# Patient Record
Sex: FEMALE | Race: WHITE
[De-identification: names, ages, dates, MRNs, and addresses within clinical notes are randomized per-mention and may not be internally consistent; named-entity substitution may affect disease eponyms.]

---

## 2017-05-31 ENCOUNTER — HOSPITAL ENCOUNTER (OUTPATIENT)
Dept: HOSPITAL 45 - X.SURG | Age: 67
Discharge: HOME | End: 2017-05-31
Attending: PHYSICAL MEDICINE & REHABILITATION
Payer: COMMERCIAL

## 2017-05-31 ENCOUNTER — HOSPITAL ENCOUNTER (OUTPATIENT)
Dept: HOSPITAL 45 - C.LAB1850 | Age: 67
Discharge: HOME | End: 2017-05-31
Attending: PHYSICIAN ASSISTANT
Payer: COMMERCIAL

## 2017-05-31 VITALS
HEIGHT: 65 IN | BODY MASS INDEX: 32.72 KG/M2 | BODY MASS INDEX: 32.72 KG/M2 | WEIGHT: 196.41 LBS | WEIGHT: 196.41 LBS | HEIGHT: 65 IN

## 2017-05-31 VITALS — HEART RATE: 70 BPM | DIASTOLIC BLOOD PRESSURE: 77 MMHG | SYSTOLIC BLOOD PRESSURE: 143 MMHG | OXYGEN SATURATION: 96 %

## 2017-05-31 VITALS — TEMPERATURE: 98.06 F

## 2017-05-31 DIAGNOSIS — E05.90: ICD-10-CM

## 2017-05-31 DIAGNOSIS — E05.90: Primary | ICD-10-CM

## 2017-05-31 DIAGNOSIS — M48.06: Primary | ICD-10-CM

## 2017-05-31 DIAGNOSIS — M54.16: ICD-10-CM

## 2017-05-31 LAB — TSH SERPL-ACNC: 0.76 UIU/ML (ref 0.3–4.5)

## 2017-05-31 NOTE — OPERATIVE REPORT
DATE OF OPERATION:  05/31/2017

 

PREOPERATIVE DIAGNOSIS:  Lumbar spinal stenosis with history of a bilateral

left greater than right radiculopathy and prior lumbar surgery.

 

POSTOPERATIVE DIAGNOSIS:  Same.

 

INDICATIONS:  The patient is a 66-year-old white female who has had prior

back surgery with persistent radicular pain.  She has responded favorably to

caudal epidural injection done 7 months ago with good relief of radicular

pain; however, has returned and is problematic to her.  She presents today

for a caudal epidural steroid injection to provide her with relief of

radicular complaints.

 

PHYSICAL EXAMINATION:  Pleasant female seated comfortably.  Point tenderness

to palpation on the left side, positive straight leg raise on the left lower

extremity, decreased subjective sensation in the L4 dermatomal distribution.

 

CONSENT:  Verbal and written consent was obtained from the patient.  Risks

and benefits were reviewed.  Risks include but are not limited to epidural

abscess, epidural hematoma, allergic reaction.  The patient wishes to

proceed.

 

PROCEDURE:  The patient was taken back to the special procedures room of the

[facility] where she was maintained in a prone position. 

Backside was cleansed with Betadine x3 and a dry sterile dressing was

applied.  Fluoroscope was used to identify the sacral hiatus and the

overlying skin was anesthetized with 5 mL of lidocaine 1% with a 25 gauge

1.5-inch needle.  A 25 gauge 3.5 inch spinal needle was then directed under

lateral fluoroscopic guidance into the canal.  She then underwent injection

after negative aspiration of 4 mL of preservative free sodium chloride and 40

mg of Depo-Medrol.  Injection was well tolerated.

 

DISPOSITION:

1. The patient is taken out into the discharge recovery area where she will

be discharged home once discharge criteria have been met.

2. Follow up in the [facility] office in 2-4 weeks.

 

 

I attest to the content of the Intraoperative Record and any orders documented therein. Any exceptio
ns are noted below.

## 2017-05-31 NOTE — DISCHARGE INSTRUCTIONS
Discharge Instructions


Date of Service


May 31, 2017.





Visit


Reason for Visit:  Lumbar Radiculopathy





Discharge


Discharge Diagnosis / Problem:  leg pain





Discharge Goals


Goal(s):  Decrease discomfort, Improve function





Activity Recommendations


Activity Limitations:  resume your previous activity





Anesthesia


.





Post Anesthesia Instructions:





If you have had General Anesthesia or IV Sedation:





*  Do not drive today.


*  Resume driving when surgeon permits.


*  Do not make important decisions or sign legal documents today.


*  Call surgeon for:





   1.  Temperature elevations greater than 101 degrees F.


   2.  Uncontrollable pain.


   3.  Excessive bleeding.


   4.  Persistent nausea and vomiting.


   5.  Medication intolerance (nausea, vomiting or rash).





*  For nausea and vomiting use only clear liquids such as: tea, soda, bouillon 

until nausea subsides, then gradually increase diet as tolerated.





*  If you have any concerns or questions, call your surgeon's office.  If 

physician is unavailable and it is an emergency, call 911 or go to the nearest 

emergency room.





.





Diet Recommendations


Recommended Home Diet:  resume previous diet





Procedures


Procedures Performed:  


Caudal Epidural Steroid Injection





Pending Studies


Studies pending at discharge:  no





Medical Emergencies








.


Who to Call and When:





Medical Emergencies:  If at any time you feel your situation is an emergency, 

please call 911 immediately.





.





Non-Emergent Contact


Non-Emergency issues call your:  Specialist





.


.





"Provider Documentation" section prepared by Arian Schmitt.








.

## 2017-07-12 ENCOUNTER — HOSPITAL ENCOUNTER (OUTPATIENT)
Dept: HOSPITAL 45 - X.SURG | Age: 67
Discharge: HOME | End: 2017-07-12
Attending: PHYSICAL MEDICINE & REHABILITATION
Payer: COMMERCIAL

## 2017-07-12 VITALS
BODY MASS INDEX: 32.72 KG/M2 | HEIGHT: 65 IN | HEIGHT: 65 IN | WEIGHT: 196.41 LBS | WEIGHT: 196.41 LBS | BODY MASS INDEX: 32.72 KG/M2

## 2017-07-12 VITALS — HEART RATE: 71 BPM | SYSTOLIC BLOOD PRESSURE: 131 MMHG | OXYGEN SATURATION: 96 % | DIASTOLIC BLOOD PRESSURE: 72 MMHG

## 2017-07-12 VITALS — TEMPERATURE: 98.96 F

## 2017-07-12 DIAGNOSIS — M46.1: Primary | ICD-10-CM

## 2017-07-12 DIAGNOSIS — Z98.1: ICD-10-CM

## 2017-07-12 DIAGNOSIS — M54.16: ICD-10-CM

## 2017-07-12 NOTE — [facility] OPERATIVE REPORT
Operative Report


Date of Service


Jul 12, 2017.





Operative Report


DICTATED BY:  Arian Schmitt M.D.


DATE OF SURGERY:  07/12/17.


 


DATE OF OPERATION:  07/12/2017.


 


PREOPERATIVE DIAGNOSIS:  Left sacroiliitis, history of fusion.


 


POSTOPERATIVE DIAGNOSIS:  Same.


 


PROCEDURE:  Left SI joint injection under fluoroscopic guidance.


 


SURGEON: Dr. Arian Schmitt.  


 


INDICATIONS:  The patient is a 66-year-old white female who last underwent


bilateral SI joint injections in December in which she had 50% improvement.  She


has had some increasing pain over the last month and presents today for


left SI joint injection to provide her with relief.


 


PHYSICAL EXAMINATION:  Pleasant female seated comfortably.  She has point


tenderness to palpation over her  SI joints bilaterally.  Sciatic notches are


nontender, slight increase with extension.  No problems with flexion.  She


has normal lower extremity strength.  Positive modified Dre maneuver. 


Negative seated straight leg raises.


 


CONSENT:  Verbal and written consent was obtained from the patient.  Risks


and benefits were reviewed.  Risks include but are not limited to abscess and


allergic reaction.  She wishes to proceed.


 


PROCEDURE:  The patient was taken back to the special procedures room of


[facility].  She was maintained in a prone position. 


Backside was cleansed with Betadine x3 and a dry sterile dressing was


applied.  Fluoroscope was used to identify the left SI joint.  The overlying


skin was anesthetized with 2 mL of lidocaine 1% with a 25 gauge 1.5-inch


needle.  A 25 gauge 3.5 inch spinal needle was then directed into the joint. 


There was a give as it entered the joint.  Isovue-300 contrast 0.25 mL was


injected in which demonstrated intraarticular uptake pattern.  She then


underwent injection after negative aspiration of 40 mg of Depo-Medrol and 1.5


mL of bupivacaine 0.25%.  


 


DISPOSITION:


1. The patient is taken out into the discharge recovery area where she will


be discharged home once discharge criteria have been met.


2. Follow up in the [facility] office in 2-4 weeks.


 


 


I attest to the content of the Intraoperative Record and any orders documented 

therein. Any exceptions are noted below.


I attest to the content of the Intraoperative Record and any orders documented 

therein.  Any exceptions are noted below.

## 2017-07-12 NOTE — DISCHARGE INSTRUCTIONS
Discharge Instructions


Date of Service


Jul 12, 2017.





Visit


Reason for Visit:  Sacroiliitis





Discharge


Discharge Diagnosis / Problem:  left sacroilitis





Discharge Goals


Goal(s):  Decrease discomfort, Improve function





Activity Recommendations


Activity Limitations:  resume your previous activity





Anesthesia


.





Post Anesthesia Instructions:





If you have had General Anesthesia or IV Sedation:





*  Do not drive today.


*  Resume driving when surgeon permits.


*  Do not make important decisions or sign legal documents today.


*  Call surgeon for:





   1.  Temperature elevations greater than 101 degrees F.


   2.  Uncontrollable pain.


   3.  Excessive bleeding.


   4.  Persistent nausea and vomiting.


   5.  Medication intolerance (nausea, vomiting or rash).





*  For nausea and vomiting use only clear liquids such as: tea, soda, bouillon 

until nausea subsides, then gradually increase diet as tolerated.





*  If you have any concerns or questions, call your surgeon's office.  If 

physician is unavailable and it is an emergency, call 911 or go to the nearest 

emergency room.





.





Diet Recommendations


Recommended Home Diet:  resume previous diet





Procedures


Procedures Performed:  


Left Sacroiliac Steroid Injection





Pending Studies


Studies pending at discharge:  no





Medical Emergencies








.


Who to Call and When:





Medical Emergencies:  If at any time you feel your situation is an emergency, 

please call 911 immediately.





.





Non-Emergent Contact


Non-Emergency issues call your:  Specialist





.


.








"Provider Documentation" section prepared by Arian Schmitt.








.

## 2017-08-10 ENCOUNTER — HOSPITAL ENCOUNTER (OUTPATIENT)
Dept: HOSPITAL 45 - C.LAB1850 | Age: 67
Discharge: HOME | End: 2017-08-10
Attending: PHYSICIAN ASSISTANT
Payer: COMMERCIAL

## 2017-08-10 DIAGNOSIS — E78.5: Primary | ICD-10-CM

## 2017-08-10 DIAGNOSIS — E05.90: ICD-10-CM

## 2017-08-10 DIAGNOSIS — E55.9: ICD-10-CM

## 2017-08-10 LAB
ALBUMIN/GLOB SERPL: 1.1 {RATIO} (ref 0.9–2)
ALP SERPL-CCNC: 68 U/L (ref 45–117)
ALT SERPL-CCNC: 25 U/L (ref 12–78)
ANION GAP SERPL CALC-SCNC: 6 MMOL/L (ref 3–11)
AST SERPL-CCNC: 16 U/L (ref 15–37)
BUN SERPL-MCNC: 12 MG/DL (ref 7–18)
BUN/CREAT SERPL: 16.7 (ref 10–20)
CALCIUM SERPL-MCNC: 9.7 MG/DL (ref 8.5–10.1)
CHLORIDE SERPL-SCNC: 105 MMOL/L (ref 98–107)
CHOLEST/HDLC SERPL: 2.9 {RATIO}
CO2 SERPL-SCNC: 29 MMOL/L (ref 21–32)
CREAT SERPL-MCNC: 0.72 MG/DL (ref 0.6–1.2)
GLOBULIN SER-MCNC: 3.6 GM/DL (ref 2.5–4)
GLUCOSE SERPL-MCNC: 81 MG/DL (ref 70–99)
GLUCOSE UR QL: 80 MG/DL
KETONES UR QL STRIP: 106 MG/DL
NITRITE UR QL STRIP: 217 MG/DL (ref 0–150)
PH UR: 229 MG/DL (ref 0–200)
POTASSIUM SERPL-SCNC: 3.8 MMOL/L (ref 3.5–5.1)
SODIUM SERPL-SCNC: 140 MMOL/L (ref 136–145)
TSH SERPL-ACNC: 1.62 UIU/ML (ref 0.3–4.5)
VERY LOW DENSITY LIPOPROT CALC: 43 MG/DL

## 2017-11-22 ENCOUNTER — HOSPITAL ENCOUNTER (OUTPATIENT)
Dept: HOSPITAL 45 - C.MAMM | Age: 67
Discharge: HOME | End: 2017-11-22
Attending: NURSE PRACTITIONER
Payer: COMMERCIAL

## 2017-11-22 DIAGNOSIS — Z12.31: Primary | ICD-10-CM

## 2017-11-24 NOTE — MAMMOGRAPHY REPORT
BILATERAL DIGITAL SCREENING MAMMOGRAM TOMOSYNTHESIS WITH CAD: 11/22/2017

CLINICAL HISTORY: Patient scheduled for annual screening mammography. During her appointment she repo
rted a new lump in the left upper outer quadrant.  





TECHNIQUE: Bilateral breast tomosynthesis in addition to standard 2D mammography was performed. Curre
nt study was also evaluated with a Computer Aided Detection (CAD) system.  



COMPARISON: Comparison is made to exams dated:  11/16/2016 mammogram, 11/13/2015 mammogram, 11/12/201
4 mammogram, 11/6/2013 mammogram, 11/5/2012 mammogram, and 11/4/2011 mammogram - [facility].   



BREAST COMPOSITION:  There are scattered areas of fibroglandular density in both breasts.  



FINDINGS:  A triangular skin palpable marker was placed on the skin of the the left upper outer quadr
ant, denoting the area of palpable mass pointed out by the patient.



There is no evidence of a new suspicious mass, asymmetry, area of distortion or cluster of microcalci
fications bilaterally, with particular attention to the area of palpable concern in the left upper ou
ter quadrant.  However, standard of care for any new palpable finding is mammography plus ultrasound 
and therefore additional targeted ultrasound is recommended in the left breast.



There are scattered stable benign-appearing coarse calcifications bilaterally.



IMPRESSION:  ACR BI-RADS CATEGORY 0: INCOMPLETE EVALUATION:  NEED ADDITIONAL IMAGING EVALUATION

Stable bilateral mammograms, without definite mammographic evidence of malignancy.  However, the cameron
ent reports a new palpable lump in the left upper outer quadrant, for which additional targeted ultra
sound is recommended.



The patient will be called to schedule an appointment.  





Approximately 10% of breast cancers are not detected with mammography. A negative mammographic report
 should not delay biopsy if a clinically suggestive mass is present.



Annette Ford M.D.          

ay/:11/22/2017 16:06:28  



Imaging Technologist: Sandra Hensley, [facility]

letter sent: Addl Imaging 0  

BI-RADS Code: ACR BI-RADS Category 0: Incomplete Evaluation:  Need Additional Imaging Evaluation

## 2017-12-08 ENCOUNTER — HOSPITAL ENCOUNTER (OUTPATIENT)
Dept: HOSPITAL 45 - C.MAMM | Age: 67
Discharge: HOME | End: 2017-12-08
Attending: INTERNAL MEDICINE
Payer: COMMERCIAL

## 2017-12-08 DIAGNOSIS — N63.20: Primary | ICD-10-CM

## 2017-12-11 NOTE — MAMMOGRAPHY REPORT
ULTRASOUND OF LEFT BREAST: 12/8/2017

CLINICAL HISTORY: The patient reported a lumpy region in her left breast at the time of her screening
 mammogram.  Further evaluation with ultrasound was recommended.  





COMPARISON: Comparison is made to exams dated:  11/22/2017 mammogram, 11/16/2016 mammogram, 11/13/201
5 mammogram, 11/12/2014 mammogram, 11/6/2013 mammogram, and 11/5/2012 mammogram - [facility].   



TECHNIQUE:  Real-time targeted ultrasound was performed of the left breast.  



FINDINGS:  Real-time, high resolution targeted ultrasound was performed of the lumpy region in the le
ft breast pointed out by the patient.  She could not pinpoint the exact location of the lumps but poi
nted to the general region in the left upper outer quadrant.  Sonographically normal tissue is seen i
n this region, without evidence of a mass or other suspicious sonographic abnormality.





IMPRESSION: ACR BI-RADS CATEGORY 1: NEGATIVE 

No suspicious sonographic abnormality at the site of the lumpiness in the left breast pointed out by 
the patient.  There is no sonographic evidence of malignancy.  Recommend clinical follow-up for the p
alpable findings, and recommend routine bilateral screening mammograms in one year.



The patient was verbally notified of the results.



Mmae Fuentes M.D.  

/:12/8/2017 14:15:16  



Imaging Technologist: Sandra PETERS(R)(M), [facility]

letter sent: Normal 1/2  

BI-RADS Code: ACR BI-RADS Category 1: Negative

## 2018-01-04 ENCOUNTER — HOSPITAL ENCOUNTER (OUTPATIENT)
Dept: HOSPITAL 45 - X.SURG | Age: 68
Discharge: HOME | End: 2018-01-04
Attending: PHYSICAL MEDICINE & REHABILITATION
Payer: COMMERCIAL

## 2018-01-04 VITALS
TEMPERATURE: 98.6 F | OXYGEN SATURATION: 95 % | DIASTOLIC BLOOD PRESSURE: 80 MMHG | HEART RATE: 77 BPM | SYSTOLIC BLOOD PRESSURE: 125 MMHG

## 2018-01-04 VITALS
WEIGHT: 200.42 LBS | BODY MASS INDEX: 33.39 KG/M2 | WEIGHT: 200.42 LBS | HEIGHT: 65 IN | BODY MASS INDEX: 33.39 KG/M2 | HEIGHT: 65 IN

## 2018-01-04 DIAGNOSIS — M46.1: Primary | ICD-10-CM

## 2018-01-04 NOTE — OPERATIVE REPORT
DATE OF OPERATION:  01/04/2018

 

PREOPERATIVE DIAGNOSIS:  Left sacroiliitis.

 

POSTOPERATIVE DIAGNOSIS:  Same.

 

PROCEDURE:  Left sacroiliac joint injection under fluoroscopic guidance.

 

SURGEON:  Dr. Arian Schmitt.  

 

INDICATIONS:  The patient is a 67-year-old white female who presents today

for a left SI joint injection for chronic sacroiliac pain.  The last

injection she received was in the third week of July.  Pain has been

returning since that time as she responds very favorably to the injections. 

She presents today for an injection to provide her with relief.

 

PHYSICAL EXAMINATION:  Pleasant female seated comfortably.  She has some

point tenderness to palpation of the left SI joint, worse with extension. 

She has normal lower extremity strength.  Negative seated straight leg

raises.

 

CONSENT:  Verbal and written consent was obtained from the patient.  Risks

and benefits were reviewed.  Risks include but are not limited to abscess and

allergic reaction.  The patient wishes to proceed.

 

PROCEDURE:  The patient was taken back in the special procedures room of the

[facility] where she was maintained in a prone position. 

Backside was cleansed with Betadine x3 and a dry sterile dressing was

applied.  Fluoroscope was used to identify the left SI joint.  The overlying

skin was anesthetized with 2.5 mL of lidocaine 1% with a 25 gauge 1.5-inch

needle.  A 25 gauge 3.5 inch needle was then used to enter the joint. 

Isovue-300 contrast 0.25 mL was injected in which showed intra-articular

placement.  She then underwent injection after negative aspiration of 40 mg

of Depo-Medrol and 1.5 mL of bupivacaine 0.25%.  Injection was well

tolerated.

 

DISPOSITION:

1. The patient is taken out into the discharge recovery area where she will

be discharged home once discharge criteria have been met.

2. Follow up in the [facility] Medicine office in 4 weeks' time.

 

 

I attest to the content of the Intraoperative Record and any orders documented therein. Any exception
s are noted below.

## 2018-01-04 NOTE — DISCHARGE INSTRUCTIONS
Discharge Instructions


Date of Service


Jan 4, 2018.





Visit


Reason for Visit:  Sacroiliitis





Discharge


Discharge Diagnosis / Problem:  low back pain





Discharge Goals


Goal(s):  Decrease discomfort, Improve function





Activity Recommendations


Activity Limitations:  resume your previous activity





Anesthesia


.





Post Anesthesia Instructions:





If you have had General Anesthesia or IV Sedation:





*  Do not drive today.


*  Resume driving when surgeon permits.


*  Do not make important decisions or sign legal documents today.


*  Call surgeon for:





   1.  Temperature elevations greater than 101 degrees F.


   2.  Uncontrollable pain.


   3.  Excessive bleeding.


   4.  Persistent nausea and vomiting.


   5.  Medication intolerance (nausea, vomiting or rash).





*  For nausea and vomiting use only clear liquids such as: tea, soda, bouillon 

until nausea subsides, then gradually increase diet as tolerated.





*  If you have any concerns or questions, call your surgeon's office.  If 

physician is unavailable and it is an emergency, call 911 or go to the nearest 

emergency room.





.





Diet Recommendations


Recommended Home Diet:  resume previous diet





Procedures


Procedures Performed:  


Left Sacroiliac Joint Injection





Pending Studies


Studies pending at discharge:  no





Medical Emergencies








.


Who to Call and When:





Medical Emergencies:  If at any time you feel your situation is an emergency, 

please call 911 immediately.





.





Non-Emergent Contact


Non-Emergency issues call your:  Specialist





.


.








"Provider Documentation" section prepared by Arian Schmitt.








.

## 2018-01-29 ENCOUNTER — HOSPITAL ENCOUNTER (OUTPATIENT)
Dept: HOSPITAL 45 - C.LAB1850 | Age: 68
Discharge: HOME | End: 2018-01-29
Attending: INTERNAL MEDICINE
Payer: COMMERCIAL

## 2018-01-29 DIAGNOSIS — I10: ICD-10-CM

## 2018-01-29 DIAGNOSIS — E05.90: Primary | ICD-10-CM

## 2018-01-29 LAB
ALBUMIN SERPL-MCNC: 3.9 GM/DL (ref 3.4–5)
ALP SERPL-CCNC: 66 U/L (ref 45–117)
ALT SERPL-CCNC: 25 U/L (ref 12–78)
AST SERPL-CCNC: 16 U/L (ref 15–37)
BUN SERPL-MCNC: 11 MG/DL (ref 7–18)
CALCIUM SERPL-MCNC: 9.7 MG/DL (ref 8.5–10.1)
CO2 SERPL-SCNC: 28 MMOL/L (ref 21–32)
CREAT SERPL-MCNC: 0.64 MG/DL (ref 0.6–1.2)
GLUCOSE SERPL-MCNC: 85 MG/DL (ref 70–99)
POTASSIUM SERPL-SCNC: 4.3 MMOL/L (ref 3.5–5.1)
PROT SERPL-MCNC: 7.4 GM/DL (ref 6.4–8.2)
SODIUM SERPL-SCNC: 138 MMOL/L (ref 136–145)

## 2018-05-09 ENCOUNTER — HOSPITAL ENCOUNTER (OUTPATIENT)
Dept: HOSPITAL 45 - X.SURG | Age: 68
Discharge: HOME | End: 2018-05-09
Attending: PHYSICAL MEDICINE & REHABILITATION
Payer: COMMERCIAL

## 2018-05-09 VITALS
WEIGHT: 200.42 LBS | BODY MASS INDEX: 33.39 KG/M2 | BODY MASS INDEX: 33.39 KG/M2 | WEIGHT: 200.42 LBS | HEIGHT: 65 IN | HEIGHT: 65 IN

## 2018-05-09 DIAGNOSIS — M46.1: Primary | ICD-10-CM

## 2018-05-09 DIAGNOSIS — M40.50: ICD-10-CM

## 2018-08-08 ENCOUNTER — HOSPITAL ENCOUNTER (OUTPATIENT)
Dept: HOSPITAL 45 - X.SURG | Age: 68
Discharge: HOME | End: 2018-08-08
Attending: PHYSICAL MEDICINE & REHABILITATION
Payer: COMMERCIAL

## 2018-08-08 VITALS
HEIGHT: 65 IN | HEIGHT: 65 IN | WEIGHT: 190.39 LBS | BODY MASS INDEX: 31.72 KG/M2 | BODY MASS INDEX: 31.72 KG/M2 | WEIGHT: 190.39 LBS

## 2018-08-08 VITALS — SYSTOLIC BLOOD PRESSURE: 126 MMHG | HEART RATE: 70 BPM | DIASTOLIC BLOOD PRESSURE: 73 MMHG | OXYGEN SATURATION: 96 %

## 2018-08-08 VITALS — TEMPERATURE: 97.7 F

## 2018-08-08 DIAGNOSIS — I10: ICD-10-CM

## 2018-08-08 DIAGNOSIS — J44.9: ICD-10-CM

## 2018-08-08 DIAGNOSIS — J45.909: ICD-10-CM

## 2018-08-08 DIAGNOSIS — I48.91: ICD-10-CM

## 2018-08-08 DIAGNOSIS — M41.9: ICD-10-CM

## 2018-08-08 DIAGNOSIS — Z79.01: ICD-10-CM

## 2018-08-08 DIAGNOSIS — F17.200: ICD-10-CM

## 2018-08-08 DIAGNOSIS — Z79.899: ICD-10-CM

## 2018-08-08 DIAGNOSIS — M19.90: ICD-10-CM

## 2018-08-08 DIAGNOSIS — M46.1: Primary | ICD-10-CM

## 2018-08-08 NOTE — [facility] POST OPERATIVE BRIEF NOTE
Immediate Operative Summary


Operative Date


Aug 8, 2018.





Pre-Operative Diagnosis





Sacroilliitis, Lordosis Lumbar Region





Post-Operative Diagnosis





same





Procedure(s) Performed





Left Sacroiliac Cooled Radio Frequency Joint Denervation





Surgeon


Dr. VANESSA Schmitt





Assistant Surgeon(s)


0





Estimated Blood Loss


0





Findings


Consistent with Post-Op Diagnosis





Specimens





none





Anesthesia Type


MAC





Complication(s)


none





Disposition


Disposition:

## 2018-08-08 NOTE — ANESTHESIA PROGRESS NT - [facility]
Anesthesia Post Op Note


Date & Time


Aug 8, 2018 at 09:34





Vital Signs


Pain Intensity:  0





Vital Signs Past 12 Hours








  Date Time  Temp Pulse Resp B/P (MAP) Pulse Ox O2 Delivery O2 Flow Rate FiO2


 


8/8/18 09:12 36.5 68 14 96/59 (71) 96 Room Air  


 


8/8/18 07:37 36.8 72 20 142/82 (102) 96 Room Air  











Notes


Mental Status:  alert / awake / arousable, participated in evaluation


Pt Amnestic to Procedure:  Yes


Nausea / Vomiting:  adequately controlled


Pain:  adequately controlled


Airway Patency, RR, SpO2:  stable & adequate


BP & HR:  stable & adequate


Hydration State:  stable & adequate


Anesthetic Complications:  no major complications apparent

## 2018-08-08 NOTE — HISTORY AND PHYSICAL: SURG CNT
History & Physical


Date


Aug 8, 2018.





Chief Complaint


Low back pain





History of Present Illness


Patient is a 67 year old white female who responds favorably to SI joint 

injections.  She presents today for a left SI joint denervation to provide 

longer lasting pain relief.





Additional History


Hepatic Disease:  No


Endocrine Disorder:  No


Kidney Disease:  No


Hypertension:  Yes


Heart Disease:  No


Bleeding Tendencies:  Yes





Allergies


Coded Allergies:  


     No Known Allergies (Unverified , NONE, 8/8/18)





Home Medications


Scheduled


Amlodipine (Norvasc), 10 MG PO QPM


Apixaban (Eliquis), 1 TAB PO HS


Cholecalciferol (Vitamin D3), 3 TABS PO QAM


Escitalopram Oxalate (Escitalopram Oxalate), 1 TAB PO QAM


Fish Oil (Omega-3), 1 CAP PO QAM


Irbesartan (Avapro), 1 TAB PO HS


Magnesium Oxide (Mag-Ox), 400 MG PO QAM


Methimazole (Methimazole), 10 MG PO QAM


Multivitamin (Multivitamin), 1 TAB PO QAM


Simvastatin (Zocor), 40 MG PO HS





Scheduled PRN


Acetaminophen (Tylenol Arthritis Ext Rel), 2 TABS PO Q8H PRN for Pain


Albuterol Sulfate (Proair Respiclick), 2 PUFFS INH Q4H PRN for SOB/Wheezing


Diphenhydramine Hcl (Benadryl Allergy), 1 CAP PO Q4-6H PRN for ALLERGIES


Psyllium (Metamucil), 1 DOSE PO DAILY PRN for Constipation





Social History


Smoking Status:  Current Every Day Smoker





Diagnosis


Left sacroilitis


ASA Classification:  ASA Class II





Operation


Left sacroilitis

## 2018-08-08 NOTE — DISCHARGE INSTRUCTIONS
Discharge Instructions


Date of Service


Aug 8, 2018.





Visit


Reason for Visit:  Sacroiliitis, Lordosis Lumbar Region





Discharge


Discharge Diagnosis / Problem:  low back pain





Discharge Goals


Goal(s):  Decrease discomfort, Improve function





Activity Recommendations


Activity Limitations:  resume your previous activity





Anesthesia


.





Post Anesthesia Instructions:





If you have had General Anesthesia or IV Sedation:





*  Do not drive today.


*  Resume driving when surgeon permits.


*  Do not make important decisions or sign legal documents today.


*  Call surgeon for:





   1.  Temperature elevations greater than 101 degrees F.


   2.  Uncontrollable pain.


   3.  Excessive bleeding.


   4.  Persistent nausea and vomiting.


   5.  Medication intolerance (nausea, vomiting or rash).





*  For nausea and vomiting use only clear liquids such as: tea, soda, bouillon 

until nausea subsides, then gradually increase diet as tolerated.





*  If you have any concerns or questions, call your surgeon's office.  If 

physician is unavailable and it is an emergency, call 911 or go to the nearest 

emergency room.





.





Diet Recommendations


Recommended Home Diet:  resume previous diet





Procedures


Procedures Performed:  


Left Sacroiliac Cooled Radio Frequency Joint Denervation





Pending Studies


Studies pending at discharge:  no





Medical Emergencies








.


Who to Call and When:





Medical Emergencies:  If at any time you feel your situation is an emergency, 

please call 911 immediately.





.





Non-Emergent Contact


Non-Emergency issues call your:  Specialist





.


.








"Provider Documentation" section prepared by Arian Schmitt.








.

## 2018-08-08 NOTE — OPERATIVE REPORT
DATE OF OPERATION:  08/08/2018

 

PREOPERATIVE DIAGNOSIS:  Left sacroiliitis, underlying scoliosis.

 

POSTOPERATIVE DIAGNOSIS:  Left sacroiliitis, underlying scoliosis.

 

PROCEDURE:  Left cooled radiofrequency denervation under conscious sedation.

 

HISTORY OF PRESENT ILLNESS:  The patient is a 67-year-old white female who

has done well with SI joint injections in the past; however, they do not

provide more than 2-3 months of solid relief.  Decision is made to do a

denervation to provide her closer to 10 months to a year of relief.

 

PHYSICAL EXAMINATION:  She is with point tenderness to palpation of left SI

joint, worse with twisting and extension.  No focal weakness.  Negative

seated straight leg raises.  Positive modified Dre maneuver.

 

CONSENT:  Verbal and written consent was obtained from the patient.  Risks

and benefits were reviewed.  Risks include, but are not limited to abscess,

allergic reaction, and nerve denervation.  She wishes to proceed.

 

PROCEDURE IN DETAIL:  The patient was taken back to OR #1.  She was under

conscious sedation.  The backside was cleansed with Betadine x3 and a dry

sterile dressing was placed.  She was anesthetized in the overlying sacral

area on the left side with 5 mL of Lidocaine 1% and the denervation needle

was placed contacting 8 separate spots, left sacral ala, left superior

lateral S1 foraminal area, the left S1 lateral foraminal area, the left

inferior lateral S1 foraminal area, the superior lateral S2 foraminal area,

the lateral S2 foraminal area, the inferior lateral S2 area, and the superior

lateral S3 area.  All underwent denervation 2 minutes and 30 seconds, well

tolerated.

 

DISPOSITION:  She is taken out into the discharge recovery area where she

will be discharged home once discharge criteria have been met.

 

 

I attest to the content of the Intraoperative Record and any orders documented therein. Any exception
s are noted below.